# Patient Record
(demographics unavailable — no encounter records)

---

## 2024-10-17 NOTE — REVIEW OF SYSTEMS
[Chills] : chills [Constipation] : constipation [As Noted in HPI] : as noted in HPI [Joint Pain] : joint pain [Joint Stiffness] : joint stiffness [Limb Pain] : limb pain [Sleep Disturbances] : sleep disturbances [Anxiety] : anxiety [Depression] : depression [Negative] : Heme/Lymph [FreeTextEntry2] : Diffuse FM pain [FreeTextEntry7] : IBS, constipation [FreeTextEntry9] : Systemic pain restricts movements [de-identified] : Miserable due to pain
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
## 2024-10-17 NOTE — PHYSICAL EXAM
[Sclera] : the sclera and conjunctiva were normal [Optic Disc Abnormality] : the optic disc were normal in size and color Gen: NAD, AOx3, able to make needs known, non-toxic  Head: NCAT  HEENT: EOMI, oral mucosa moist, normal conjunctiva  Lung: CTAB, no respiratory distress, no wheezes/rhonchi/rales B/L, speaking in full sentences  CV: RRR, no murmurs  Abd: soft, NTND, no guarding  MSK: L 5th digit: distal digit angulated laterally, sensation intact, decreased range of medication. Remainder of digits on L unremarkable and w/o tenderness on exam. L wrist full ROM w/o tenderness. RUE unremarkable. Distal pulses intact b/l  Neuro: No focal sensory or motor deficits  Skin: Warm, well perfused  Psych: normal affect [Outer Ear] : the ears and nose were normal in appearance [Neck Appearance] : the appearance of the neck was normal [] : no respiratory distress [Apical Impulse] : the apical impulse was normal [Arterial Pulses Carotid] : carotid pulses were normal with no bruits [Breast Appearance] : normal in appearance [Cervical Lymph Nodes Enlarged Posterior Bilaterally] : posterior cervical [Cervical Lymph Nodes Enlarged Anterior Bilaterally] : anterior cervical [Supraclavicular Lymph Nodes Enlarged Bilaterally] : supraclavicular [Abnormal Walk] : normal gait [Skin Color & Pigmentation] : normal skin color and pigmentation [FreeTextEntry1] : Initial Visit: SES - 10 PSD - 33 FIQ - 101 PHQ - 12 EVAN - 11 EPS - 10 Sex - 61 Gen: NAD, AOx3, able to make needs known, non-toxic  Head: NCAT  HEENT: EOMI, oral mucosa moist, normal conjunctiva  Lung: CTAB, no respiratory distress, no wheezes/rhonchi/rales B/L, speaking in full sentences  CV: RRR, no murmurs  Abd: soft, NTND, no guarding  MSK: L 5th digit: distal digit angulated laterally at PIP, sensation intact, decreased range of medication. Remainder of digits on L unremarkable and w/o tenderness on exam. L wrist full ROM w/o tenderness. RUE unremarkable. Distal pulses intact b/l  Neuro: No focal sensory or motor deficits  Skin: Warm, well perfused  Psych: normal affect Gen: NAD, AOx3, able to make needs known, non-toxic  Head: NCAT  HEENT: EOMI, oral mucosa moist, normal conjunctiva  Lung: CTAB, no respiratory distress, no wheezes/rhonchi/rales B/L, speaking in full sentences  CV: RRR, no murmurs  Abd: soft, NTND, no guarding  MSK: L 5th digit: distal digit angulated laterally at PIP, sensation intact, decreased range of motion. Remainder of digits on L unremarkable and w/o tenderness on exam. L wrist full ROM w/o tenderness. RUE unremarkable. Distal pulses intact b/l  Neuro: No focal sensory or motor deficits  Skin: Warm, well perfused  Psych: normal affect

## 2024-10-17 NOTE — ASSESSMENT
[FreeTextEntry1] : 67yo wf with hx of OA multisite and diffuse pain dx w/ FM, Depression/ Anxiety, s/p cardiac ablation    SH:   white woman, mother of 3 adult children, lives with , minimal exercise, daily ETOH 0-1 glass wine, denies hx trauma  - Physicians Referring Pain Medicine - Rogelio Mendez Primary Care - Bety Colmenares Orthopedic surgeon - Rafal Hernandez Psychologist - Rosie Sarabia  1) FM:  certainly  meets 1990, 2010/11 and now 2016 ACR/ EULAR criteria for FM with initial PSD 33- high and FIQ also nearly max at 100.. in addition to depression (PHQ 12)/ anxiety (EVAN 11).  Most severe pain L shoulder, neck, upper back, both arm/elbow/wrist, both legs and knees, hip, buttocks, describes stabbing, and dull pain. Muscle knots "all over the body".  Currently taking Wellbutrin 300 mg, Naltrexone 8 mg, and Progesterone DHEA 100 mg, daily pre/ pro biotic, with Omega 3 FA... for chronic pain with some but incomplete response. Routinely works with psychologist.  PRN Aleve - better tolerated than Advil.    Most bothered by myofascial pain  Function:   - Sleep:  Mild REINA, CPAP not recommended (no other abn noted in sleep study).. CPAP not recommended but still NRS- rarely sleeps for > 4 hours uninterrupted- wakes many times > 4 throughout the night.. can't remember last time that happened... and more than 1 hour latency.. Does use audiobooks to help/ herbal agents with some but incomplete response.  - IBS c/D which sometimes wakes her up from sleep (sometimes takes Senna for constipation (BM every 2 days or so), takes magnesium at night, claims fiber makes it "worse").  Has worked with nutritionist, tried elimination diet and FODMAP - reports no benefit.  Claims some improvement with carnivore diet.  Last colonoscopy 4years ago NEG  - Severe myofascial pain:   throughout neck and back for many ys (since  2018).  Massage/ acupuncture with some but incomplete response... TPI with some but not sustained benefit.  Repeated attempts at muscle relaxants too sedating..  - Temperature sensitivity:  regular chills, too hot/ too cold.. sensitive to heat  NOTE:  - baseline FM labs 5/24 negative PREVIOUS MEDICATIONS: Very sensitive to medications  - Opiates:  tolerates tramadol, rarely taken.. allergic to oxycodone and codeine.  APAP not helpful   - NSAIDs:  Aleve 250 mg 2 tablets twice daily tolerated better than Ibuprofen. - Antidepressants: Tried Cymbalta 30 mg once daily in the past and didn't feel well - felt like a zombie. Currently on Wellbutrin XL and feels that it helped her pain a little bit. - Anticonvulsants: Lyrica 50 mg and gabapentin 100 mg once daily and it made her lethargic  - Muscle relaxers: In the past, tried a few muscle relaxers and felt like a zombie and couldn't function the next day. Patient can't remember the name or doses. - Other Pain Medications:   Lidocaine cream didn't work. Lidocaine injections for TPI with mild intermittent response  - Sedatives/sleep medications: melatonin 10 mg at bedtime and it didn't work and bad dreams and hallucinations  - Injections: facet injection (working for short period of time)/ and TPI also short duration + response  - Other: acupuncture and chiropractor helped a little for 1-2 days..  now that she is retired so she stopped - Physical therapy: TENS unit and PT helped a little  2) OA multisite:  OA r/t pain better with Wellbutrin.. > 10 ys  - Knees bone on bone: gel injections, received Cortisone injection a month ago, does PT - reports benefit  - Hips:  L TKR has also had hip replacement - has been taking Wellbutrin for pain for >10 years, tolerates very well - Cervical:  s/p c spine surgery (C 3-6 fusion 2018) - Lumbar:   - Hands:  R 4 trigger finger released   Discussion:  Longstanding dx of FM with multisite OA and most severe issue r/t myofascial pain.  Has tried TPI but not long lasting.. needs to work on SM for trigger points with daily self massage (has mechanical device at home).. and retry muscle relaxant- ideally cyclobenzaprine starting with 5 mg nightly (if amitriptyline is not effective)... should help with more restorative sleep and neck/ back pain..  Should also consider returning to PT if not much improved with above.   Plan 10/3/24: - Consider Amitriptyline 10-30 mg QHS emphasized need for consistent dosing.. looking to keep her asleep..  - bc poor tolerance to muscle relaxants.. if not effective would change to cyclobenzaprine 5-10 mg...   - may in future need to make adjustments to Wellbutrin.. for now continue 300 mg.  - Attend FM lecture ASAP   - add PT for myofascial pain.. and make sure they understand FM   - Highly recommend value of PMR:  Look into progressive relaxation exercises (15-30min every day for at least 2 months)- should teach you what tension feels.  Ideally 20-30 mins daily for at least 6-8 weeks..   - Slowly but consistently increase duration of exercise - monitor number of steps per day Start with 50% of what you can tolerate but every day (at least 5 days / wk) and progressively increase by 10% wkly as tolerated.   - Continue to work with therapist - ask about cognitive-behavioral strategies for managing chronic pain strategies..   - If Amitriptyline is well-tolerated, follow-up in 6w- if not let us know and will try low dose cyclobenzaprine.  Ideally find name and dose of agents tried in the past.

## 2024-11-15 NOTE — PHYSICAL EXAM
[Chaperone Present] : A chaperone was present in the examining room during all aspects of the physical examination [12564] : A chaperone was present during the pelvic exam. [FreeTextEntry2] : Salma Lockhart PA-C [Abnormal] : Cervix: Abnormal [Normal] : Bimanual Exam: Normal [de-identified] : cervix scarred posteriorly almost to the rectum

## 2024-11-15 NOTE — PHYSICAL EXAM
[Chaperone Present] : A chaperone was present in the examining room during all aspects of the physical examination [72384] : A chaperone was present during the pelvic exam. [FreeTextEntry2] : Salma Lockhart PA-C [Abnormal] : Cervix: Abnormal [Normal] : Bimanual Exam: Normal [de-identified] : cervix scarred posteriorly almost to the rectum

## 2024-11-15 NOTE — ASSESSMENT
[FreeTextEntry1] : 65 y/o female thickened endometrial lining and vaginal staining in the setting of HRT, failed previously attempted D&C secondary to cervical scarring from previous LEEP. I discussed with the patient her physical examination findings significant for cervix scarred posteriorly to the rectum, which would make D&C difficult. I discussed option of trial D&C again vs. hysterectomy. In the setting of HRT which patient reports is helping her symptoms and she would like to proceed on this therapy, if D&C is unsuccessful again, hysterectomy would be recommended for definitive diagnosis. I discussed also in the future she could continue estrogen therapy without the use of progesterone given uterus surgically absent. Patient prefers to proceed with definitive surgery at this time as she would like to continue use of HRT In the future.   I discussed at length with the patient the nature, purpose, risks, benefits, and alternatives of Robot assisted total laparoscopic hysterectomy with bilateral salpingo-oophorectomy.  The patient understands the risks to include (but not be limited to) bowel injury, bleeding (with the possible need for transfusion), bladder or ureteral injury, infections, deep venous thrombosis, and lilian-operative death.  The patient also understands that her surgery may not be able to be performed robotically and that she may need a laparotomy.  She also understands the limitations of robotic surgery and the possibility of missing a surgical complication with need for subsequent re-exploration.  She agrees to proceed.  She asked numerous questions which were answered to her satisfaction.  She understands the need for a pre-operative bowel preparation and agrees to comply with our instructions.  She also understands the rationale for a cystoscopy at the completion of the procedure and the potential risks of cystoscopy.

## 2024-11-15 NOTE — PLAN
[TextEntry] : CHUCKY PAULINO BSO, Cystoscopy @ [facility] (patient prefers 12/2024 as she is scheduled for TKR in 2/2025) Surgical consent signed in the office today  Insurance hysterectomy consent signed  PST and Medical Clearance  ERP and Miralax/Dulcolax bowel prep instructions reviewed with the patient

## 2024-11-15 NOTE — HISTORY OF PRESENT ILLNESS
[FreeTextEntry1] : 65 y/o  [place]  female, LMP at age 59 being referred by Dr. Fraser for evaluation of thickened endometrial lining and vaginal staining in the setting of HRT. Patient currently on Estradiol patch and PO progesterone for menopausal symptoms which she reports is helping her. Patient with previous episode of PMB which was while she was not on HRT and D&C was attempted at that time by [facility] GYN ONC and was unsuccessful due to cervical scarring 2/2 previous LEEP, cleared to resume HRT at that time as lining was stable and recommended further work up if spotting resumed. She reports to episode of light vaginal bleeding (more than spotting but less than a menstrual cycle x 2) in July and August of this year.Denies abdominal pain/bloating/distension, pelvis discomfort, changes in normal bowel/urinary habits, nausea/vomiting, unintentional weight loss/gain, and all other associated signs and symptoms.  Patient is currently sexually active, denies pain and/or bleeding.   24 US: Anteverted 5.09 x 3.78 x 2.38. Endometrium 4.74mm. Heterogeneous uterus with a single intramural fibroid.   Patients family history significant for maternal aunt with uterine/ovarian cancer and maternal aunt with breast cancer > 50. Sister with rectal and liver cancer. Patient reports she is BRCA negative.   Health Screening: Last Pap: 2024; Normal upon chart review  Last Mammogram: 2024; normal as per patient  Last Colonoscopy: 5 years ago; benign polyp as per patient

## 2024-11-15 NOTE — CHIEF COMPLAINT
[FreeTextEntry1] : [place] [employer] [facility]  [facility] [phone] at [address] [facility] [id number]

## 2024-11-15 NOTE — END OF VISIT
[FreeTextEntry3] : I, Dr. Rocky Elam, personally performed the evaluation and management (E/M) services for this new patient. That E/M includes conducting the initial examination, assessing all conditions, and establishing the plan of care. Today, Salma Lockhart PA-C, was here to observe my evaluation and management services for this patient to be followed going forward.

## 2024-11-15 NOTE — HISTORY OF PRESENT ILLNESS
[FreeTextEntry1] : 65 y/o  [place]  female, LMP at age 59 being referred by Dr. Fraser for evaluation of thickened endometrial lining and vaginal staining in the setting of HRT. Patient currently on Estradiol patch and PO progesterone for menopausal symptoms which she reports is helping her. Patient with previous episode of PMB which was while she was not on HRT and D&C was attempted at that time by [facility] GYN ONC and was unsuccessful due to cervical scarring 2/2 previous LEEP, cleared to resume HRT at that time as lining was stable and recommended further work up if spotting resumed. She reports to episode of light vaginal bleeding (more than spotting but less than a menstrual cycle x 2) in July and August of this year.Denies abdominal pain/bloating/distension, pelvis discomfort, changes in normal bowel/urinary habits, nausea/vomiting, unintentional weight loss/gain, and all other associated signs and symptoms.  Patient is currently sexually active, denies pain and/or bleeding.   24 US: Anteverted 5.09 x 3.78 x 2.38. Endometrium 4.74mm. Heterogeneous uterus with a single intramural fibroid.   Patients family history significant for maternal aunt with uterine/ovarian cancer and maternal aunt with breast cancer > 50. Sister with rectal and liver cancer. Patient reports she is BRCA negative.   Health Screening: Last Pap: 2024; Normal upon chart review  Last Mammogram: 2024; normal as per patient  Last Colonoscopy: 5 years ago; benign polyp as per patient

## 2024-11-15 NOTE — PLAN
[TextEntry] : CHUCKY PAULINO BSO, Cystoscopy @ [facility] (patient prefers 12/2024 as she is scheduled for TKR in 2/2025) Surgical consent signed in the office today  Insurance hysterectomy consent signed  PST and Medical Clearance  ERP and Miralax/Dulcolax bowel prep instructions reviewed with the patient

## 2024-11-15 NOTE — HISTORY OF PRESENT ILLNESS
[FreeTextEntry1] : 65 y/o  [place]  female, LMP at age 59 being referred by Dr. Fraser for evaluation of thickened endometrial lining and vaginal staining in the setting of HRT. Patient currently on Estradiol patch and PO progesterone for menopausal symptoms which she reports is helping her. Patient with previous episode of PMB which was while she was not on HRT and D&C was attempted at that time by [facility] GYN ONC and was unsuccessful due to cervical scarring 2/2 previous LEEP, cleared to resume HRT at that time as lining was stable and recommended further work up if spotting resumed. She reports to episode of light vaginal bleeding (more than spotting but less than a menstrual cycle x 2) in July and August of this year.Denies abdominal pain/bloating/distension, pelvis discomfort, changes in normal bowel/urinary habits, nausea/vomiting, unintentional weight loss/gain, and all other associated signs and symptoms.  Patient is currently sexually active, denies pain and/or bleeding.   24 US: Anteverted 5.09 x 3.78 x 2.38. Endometrium 4.74mm. Heterogeneous uterus with a single intramural fibroid.   Patients family history significant for maternal aunt with uterine/ovarian cancer and maternal aunt with breast cancer > 50. Sister with rectal and liver cancer. Patient reports she is BRCA negative.   Health Screening: Last Pap: 2024; Normal upon chart review  Last Mammogram: 2024; normal as per patient  Last Colonoscopy: 5 years ago; benign polyp as per patient

## 2024-11-15 NOTE — PLAN
[TextEntry] : CHUCKY PAULINO BSO, Cystoscopy @ [facility] (patient prefers 12/2024 as she is scheduled for TKR in 2/2025) Surgical consent signed in the office today  Insurance hysterectomy consent signed  PST and Medical Clearance  ERP and Miralax/Dulcolax bowel prep instructions reviewed with the patient

## 2024-11-15 NOTE — CHIEF COMPLAINT
[FreeTextEntry1] : [place] Office  [facility] [phone] at [address] [facility] [address]

## 2024-11-15 NOTE — PHYSICAL EXAM
[Chaperone Present] : A chaperone was present in the examining room during all aspects of the physical examination [[id number]] : A chaperone was present during the pelvic exam. [FreeTextEntry2] : Salma Lockhart PA-C [Abnormal] : Cervix: Abnormal [Normal] : Bimanual Exam: Normal [de-identified] : cervix scarred posteriorly almost to the rectum

## 2024-11-15 NOTE — CHIEF COMPLAINT
[FreeTextEntry1] : [place] Office  [facility] [phone] at [address] [facility] [address]

## 2024-12-02 NOTE — PHYSICAL EXAM
[General Appearance - Alert] : alert [General Appearance - In No Acute Distress] : in no acute distress [General Appearance - Well Nourished] : well nourished [General Appearance - Well Developed] : well developed [General Appearance - Well-Appearing] : healthy appearing [Abnormal Walk] : normal gait [FreeTextEntry1] : Initial Visit 10/3/24 : SES - 10 PSD - 33 FIQ - 101 PHQ - 12 EVAN - 11 EPS - 10 Sex - 61- mood much improved with better pain

## 2024-12-02 NOTE — ASSESSMENT
[FreeTextEntry1] : 67yo wf with hx of OA multisite and diffuse pain dx w/ FM, Depression/ Anxiety, s/p cardiac ablation    SH:   white woman, mother of 3 adult children, lives with , minimal exercise, daily ETOH 0-1 glass wine, denies hx trauma  - Physicians Referring Pain Medicine - Rogelio Mendez Primary Care - Bety Colmenares Orthopedic surgeon - Rafal Hernandez Psychologist - Rosie Sarabia  1) FM:  certainly  meets 1990, 2010/11 and now 2016 ACR/ EULAR criteria for FM with initial PSD 33- high and FIQ also nearly max at 100.. in addition to depression (PHQ 12)/ anxiety (EVAN 11).  Most severe pain L shoulder, neck, upper back, both arm/elbow/wrist, both legs and knees, hip, buttocks, describes stabbing, and dull pain. Muscle knots "all over the body".  Currently taking Wellbutrin 300 mg, Naltrexone 8 mg, and Progesterone DHEA 100 mg, daily pre/ pro biotic, with Omega 3 FA...  Only change from initial visit is change to KETO diet with 20 gm CHO... now sleeping much better - more restorative and feeling great!   Routinely works with psychologist.  PRN Aleve - better tolerated than Advil.    Most bothered by myofascial pain  Function:   - Sleep:  Mild REINA, CPAP not recommended (no other abn noted in sleep study).. CPAP not recommended but still NRS- rarely sleeps for > 4 hours uninterrupted- wakes many times > 4 throughout the night.. can't remember last time that happened... and more than 1 hour latency.. Does use audiobooks to help/ herbal agents with some but incomplete response.  - IBS c/D which sometimes wakes her up from sleep (sometimes takes Senna for constipation (BM every 2 days or so), takes magnesium at night, claims fiber makes it "worse").  Has worked with nutritionist, tried elimination diet and FODMAP - reports no benefit.  Claims some improvement with carnivore diet.  Last colonoscopy 4years ago NEG  - Severe myofascial pain:   throughout neck and back for many ys (since  2018).  Massage/ acupuncture with some but incomplete response... TPI with some but not sustained benefit.  Repeated attempts at muscle relaxants too sedating.. and no longer necessary.  - Temperature sensitivity:  regular chills, too hot/ too cold.. sensitive to heat - better with estrogen topically  NOTE:  - baseline FM labs 5/24 negative PREVIOUS MEDICATIONS: Very sensitive to medications  - Opiates:  tolerates tramadol, rarely taken.. allergic to oxycodone and codeine.  APAP not helpful   - NSAIDs:  Aleve 250 mg 2 tablets twice daily tolerated better than Ibuprofen. - Antidepressants: Tried Cymbalta 30 mg once daily in the past and didn't feel well - felt like a zombie. Currently on Wellbutrin XL and feels that it helped her pain a little bit. - Anticonvulsants: Lyrica 50 mg and gabapentin 100 mg once daily and it made her lethargic  - Muscle relaxers: In the past, tried a few muscle relaxers and felt like a zombie and couldn't function the next day. Patient can't remember the name or doses. - Other Pain Medications:   Lidocaine cream didn't work. Lidocaine injections for TPI with mild intermittent response  - Sedatives/sleep medications: melatonin 10 mg at bedtime and it didn't work and bad dreams and hallucinations  - Injections: facet injection (working for short period of time)/ and TPI also short duration + response  - Other: acupuncture and chiropractor helped a little for 1-2 days..  now that she is retired so she stopped - Physical therapy: TENS unit and PT helped a little  2) OA multisite:  OA r/t pain better with Wellbutrin.. > 10 ys  - Knees bone on bone: gel injections, received Cortisone injection a month ago, does PT - reports benefit - but scheduled for L TKR now.. 2/5/25  - Hips:  L THR has also had hip replacement - has been taking Wellbutrin for pain for >10 years, tolerates very well - Cervical:  s/p c spine surgery (C 3-6 fusion 2018) - Lumbar:   - Hands:  R 4 trigger finger released   Discussion:  Longstanding dx of FM with multisite OA and most severe issue r/t myofascial pain.  Has tried TPI but not long lasting.. needs to work on SM for trigger points with daily self massage (has mechanical device at home).. If myofascial pain returns especially following 2 fairly major surgeries within months of each other.. may need to reconsider. Encouraged to try to space out surgery if possible.. would like to go into TKR with prehab.  -  If needed would retry muscle relaxant- ideally cyclobenzaprine starting with 5 mg nightly (if amitriptyline is not effective)... should help with more restorative sleep and neck/ back pain..  Should also consider returning to PT if not much improved with above... feels like she's doing enough exercise on her own...   Regarding surgery: should hold Naltrexone for 3 days before and 7-10 days after in effort for + response to opiates if needed postop.   Also recommend holding OFA/ and Provitalize 3 days before surgery for potential bleeding..    Plan 12/2/24: - continue to follow healthy diet and monitor Lipids - noted increase in LDL with high fat diet..   - See above for surgical recommendations.   - FM likely to flare after 2 large - surgeries... especially because may not be able to do exercises needed to mnt health as currently doing. SLeep also likely to be disrupted 2/2 pain   - ideally recommend continue with exercise and do knee first then after full recovery do DOYLE.. this would minimize a FM flare tremendously!    - Consider  Amitriptyline 10-30 mg QHS emphasized need for consistent dosing.. looking to keep her asleep..  - bc poor tolerance to muscle relaxants.. if not effective would change to cyclobenzaprine 5-10 mg... if needed postop   - may in future need to make adjustments to Wellbutrin.. for now continue 300 mg.  - add PT for myofascial pain.. and make sure they understand FM .. if needed   - Highly recommend value of PMR:  Look into progressive relaxation exercises (15-30min every day for at least 2 months)- should teach you what tension feels.  Ideally 20-30 mins daily for at least 6-8 weeks..   - Slowly but consistently increase duration of exercise - monitor number of steps per day- should increase time on bike by 5 mins every 2 wks  Start with 50% of what you can tolerate but every day (at least 5 days / wk) and progressively increase by 10% wkly as tolerated.   - Continue to work with therapist - ask about cognitive-behavioral strategies for managing chronic pain strategies..   - RPA 3-4 ms
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
## 2024-12-02 NOTE — REVIEW OF SYSTEMS
[Chills] : chills [Constipation] : constipation [As Noted in HPI] : as noted in HPI [Joint Pain] : joint pain [Joint Stiffness] : joint stiffness [Limb Pain] : limb pain [Sleep Disturbances] : sleep disturbances [Anxiety] : anxiety [Depression] : depression [Negative] : Heme/Lymph [FreeTextEntry2] : Diffuse FM pain [FreeTextEntry7] : IBS, constipation [FreeTextEntry9] : Systemic pain restricts movements [de-identified] : Miserable due to pain

## 2024-12-31 NOTE — PLAN
[TextEntry] : CBC ordered, will call with results Follow up for telehealth with Dr. Elam as scheduled 1/3/2025 Follow up for cuff check 6 weeks post operatively Call office sooner with any new symptoms

## 2024-12-31 NOTE — REASON FOR VISIT
[Post Op] : post op visit [de-identified] : 12/12/2024 [de-identified] : TRH, BSO, uterosacral ligament fixation, cystoscopy  [de-identified] : The patient is healing remarkably well. Her pain is well controlled without medication. She does endorse feeling very fatigued since surgery, although admits this is getting better with each passing day. She denies a change in appetite, or a change in weight. She is tolerating PO without nausea or vomiting. She has been ambulating at home without complaints. She has had bowel movements since being home. She denies fever, abdominal pain, vaginal bleeding or discharge, chest pain, shortness of breath, leg pain, change in urinary or bowel habits.

## 2024-12-31 NOTE — ASSESSMENT
[FreeTextEntry1] : 67 yo s/p TRH BSO uterosacral ligament fixation, cystoscopy on 12/12/2024.   The patient is healing well without complication. We discussed ongoing recuperation and reviewed final pathology results briefly. We reviewed the need for following up with Dr. Elam for telehealth as scheduled on 1/3/25 for detailed review of pathology. She will also need to follow up for a cuff check at the 6 week cinthia. Regarding her fatigue, I discussed with the patient that it is normal to feel more fatigued than normal after surgery and anesthesia. However, I ordered CBC to ensure her hemoglobin has remained stable since surgery. The patient stated and expressed a good understanding of the above information.  All questions and concerns were answered to patient's apparent satisfaction.

## 2024-12-31 NOTE — DISCUSSION/SUMMARY
[Clean] : was clean [Dry] : was dry [None] : had no drainage [Normal Skin] : normal appearance [Firm] : soft [Tender] : nontender [Abnormal Bowel Sounds] : normal bowel sounds [Rebound] : no rebound tenderness [Guarding] : no guarding [Incisional Hernia] : no incisional hernia [Mass] : no palpable mass [Doing Well] : is doing well [Excellent Pain Control] : has excellent pain control [No Sign of Infection] : is showing no signs of infection [0] : 0 [FreeTextEntry1] : OPERATIVE FINDINGS: Exam under anesthesia with atrophic normal vulva, vagina and cervix flush with upper vagina. Pinpoint area of cervical os unable to perform dilation and unable to place ANGEL manipulator for manipulation. EEA sizer used to perform colpotomy. No concerning intra-abdominal findings, small normal uterus and normal bilateral fallopian tubes and ovaries. No adhesive disease. Normal upper abdomen and liver edge. Cystoscopy: normal bilateral ureteral jets, no injury to the bladder, no lesions.  PATHOLOGY- Diagnosis Uterus bilateral ovaries bilateral fallopian tubes, cervix, resection: Endocervix: *  Benign endocervix Endometrium: *  Inactive endometrium. Myometrium: *  Leiomyoma.  Ovaries: *  Benign ovaries with surface adhesions. Fallopian tubes: *  Benign fallopian tubes; negative for significant epithelial atypia with benign paratubal cyst.

## 2024-12-31 NOTE — REASON FOR VISIT
[Post Op] : post op visit [de-identified] : 12/12/2024 [de-identified] : TRH, BSO, uterosacral ligament fixation, cystoscopy  [de-identified] : The patient is healing remarkably well. Her pain is well controlled without medication. She does endorse feeling very fatigued since surgery, although admits this is getting better with each passing day. She denies a change in appetite, or a change in weight. She is tolerating PO without nausea or vomiting. She has been ambulating at home without complaints. She has had bowel movements since being home. She denies fever, abdominal pain, vaginal bleeding or discharge, chest pain, shortness of breath, leg pain, change in urinary or bowel habits.

## 2025-01-03 NOTE — HISTORY OF PRESENT ILLNESS
[Home] : at home, [unfilled] , at the time of the visit. [Other Location: e.g. Home (Enter Location, City,State)___] : at [unfilled] [Verbal consent obtained from patient] : the patient, [unfilled] [FreeTextEntry1] : PT is a 67 yo s/p TRH, BSO on 12/12/24 for thickened endometrial lining and vaginal staining in the setting of HRT, failed previously attempted D&C secondary to cervical scarring from previous LEEP.  She presents to discuss pathology and post op follow up.   Indication: transportation

## 2025-01-03 NOTE — END OF VISIT
[FreeTextEntry3] : RTC in 4 weeks for cuff check [Time Spent: ___ minutes] : I have spent [unfilled] minutes of time on the encounter which excludes teaching and separately reported services.

## 2025-01-03 NOTE — ASSESSMENT
[FreeTextEntry1] : Pt is a 65 yo s/p TRH, BSO on 12/12/24 for thickened endometrial lining and vaginal staining in the setting of HRT, failed previously attempted D&C secondary to cervical scarring from previous LEEP.  She presents to discuss pathology and post op follow up.   Pathology all benign and reviewed with the patient. She reprots decreased energy but otherwise recovering well. Discussed that is normal and she can start with light exercise but no intercourse until cuff exam in 1 month.

## 2025-01-23 NOTE — REASON FOR VISIT
[Other ___] : [unfilled] [de-identified] : 12/12/2024 [de-identified] : TRH, BSO, uterosacral ligament fixation, cystoscopy [de-identified] : Pt is 6W0D post op today. She is healing remarkably well. She denies a change in appetite, or a change in weight. She is tolerating PO without nausea or vomiting. She denies VB/VD. She denies CP/SOB.

## 2025-01-23 NOTE — ASSESSMENT
[FreeTextEntry1] : Pt is 65 yo s/p TRH, BSO on 12/12/24. Pathology benign. She has recovered well. Vaginal cuff well healed.   Recommend follow up with PCP to discuss changes in hair and concern regarding nausea which could be related to reflux.

## 2025-01-23 NOTE — END OF VISIT
[FreeTextEntry3] : Discharge to routine gynecologic care [FreeTextEntry2] :  ROXIE Simeon was present the entire duration of the patient interaction and gynecological exam.

## 2025-01-23 NOTE — REASON FOR VISIT
[Other ___] : [unfilled] [de-identified] : 12/12/2024 [de-identified] : TRH, BSO, uterosacral ligament fixation, cystoscopy [de-identified] : Pt is 6W0D post op today. She is healing remarkably well. She denies a change in appetite, or a change in weight. She is tolerating PO without nausea or vomiting. She denies VB/VD. She denies CP/SOB.

## 2025-01-23 NOTE — REASON FOR VISIT
[Other ___] : [unfilled] [de-identified] : 12/12/2024 [de-identified] : TRH, BSO, uterosacral ligament fixation, cystoscopy [de-identified] : Pt is 6W0D post op today. She is healing remarkably well. She denies a change in appetite, or a change in weight. She is tolerating PO without nausea or vomiting. She denies VB/VD. She denies CP/SOB.

## 2025-01-29 NOTE — REVIEW OF SYSTEMS
[Patient Intake Form Reviewed] : Patient intake form was reviewed [Fatigue] : fatigue [Night Sweats] : night sweats

## 2025-01-31 NOTE — DISCUSSION/SUMMARY
[FreeTextEntry1] : We discussed increasing the dosage of her Estradiol patch to 0.0375 and patient is in agreement with this plan. I reviewed ACHES warning signs of hormone therapy. Patient to try for one-two months to allow her body to adjust to new hormone level We can increase to 0.5mg if necessary but I prefer to stay at the lowest effective dose  Patient verbalizes understanding of and agreement with this plan.  All questions answered to patient's satisfaction.

## 2025-01-31 NOTE — HISTORY OF PRESENT ILLNESS
[postmenopausal] : postmenopausal [FreeTextEntry1] : 67 yo VLADIMIR identifies herself by name and  and agrees to telephonic visit to discuss adjustment of her MHT regimen after DOYLE for AUB.  She reports hot flashes at night, fatigue, lack of energy.  Has discontinued Progesterone already.  Has been on 0.25mg Estradiol patch twice weekly.  She reports no adverse effects to patch but is not finding it sufficiently effective.

## 2025-01-31 NOTE — HISTORY OF PRESENT ILLNESS
[postmenopausal] : postmenopausal [FreeTextEntry1] : 65 yo VLADIMIR identifies herself by name and  and agrees to telephonic visit to discuss adjustment of her MHT regimen after DOYLE for AUB.  She reports hot flashes at night, fatigue, lack of energy.  Has discontinued Progesterone already.  Has been on 0.25mg Estradiol patch twice weekly.  She reports no adverse effects to patch but is not finding it sufficiently effective.